# Patient Record
Sex: MALE | Race: WHITE | NOT HISPANIC OR LATINO | Employment: FULL TIME | URBAN - NONMETROPOLITAN AREA
[De-identification: names, ages, dates, MRNs, and addresses within clinical notes are randomized per-mention and may not be internally consistent; named-entity substitution may affect disease eponyms.]

---

## 2021-06-17 DIAGNOSIS — M25.561 RIGHT KNEE PAIN, UNSPECIFIED CHRONICITY: Primary | ICD-10-CM

## 2021-06-22 ENCOUNTER — OFFICE VISIT (OUTPATIENT)
Dept: ORTHOPEDIC SURGERY | Facility: CLINIC | Age: 56
End: 2021-06-22

## 2021-06-22 ENCOUNTER — HOSPITAL ENCOUNTER (OUTPATIENT)
Dept: GENERAL RADIOLOGY | Facility: HOSPITAL | Age: 56
Discharge: HOME | End: 2021-06-22
Admitting: PHYSICIAN ASSISTANT

## 2021-06-22 VITALS
DIASTOLIC BLOOD PRESSURE: 86 MMHG | HEIGHT: 66 IN | WEIGHT: 235 LBS | SYSTOLIC BLOOD PRESSURE: 122 MMHG | BODY MASS INDEX: 37.77 KG/M2 | HEART RATE: 73 BPM

## 2021-06-22 DIAGNOSIS — S83.241A OTHER TEAR OF MEDIAL MENISCUS OF RIGHT KNEE AS CURRENT INJURY, INITIAL ENCOUNTER: ICD-10-CM

## 2021-06-22 DIAGNOSIS — M25.561 RIGHT KNEE PAIN, UNSPECIFIED CHRONICITY: ICD-10-CM

## 2021-06-22 DIAGNOSIS — S83.411A SPRAIN OF MEDIAL COLLATERAL LIGAMENT OF RIGHT KNEE, INITIAL ENCOUNTER: Primary | ICD-10-CM

## 2021-06-22 DIAGNOSIS — M17.11 PRIMARY OSTEOARTHRITIS OF RIGHT KNEE: ICD-10-CM

## 2021-06-22 PROCEDURE — 20610 DRAIN/INJ JOINT/BURSA W/O US: CPT | Performed by: PHYSICIAN ASSISTANT

## 2021-06-22 PROCEDURE — 73562 X-RAY EXAM OF KNEE 3: CPT | Performed by: RADIOLOGY

## 2021-06-22 PROCEDURE — 99203 OFFICE O/P NEW LOW 30 MIN: CPT | Performed by: PHYSICIAN ASSISTANT

## 2021-06-22 PROCEDURE — 73562 X-RAY EXAM OF KNEE 3: CPT

## 2021-06-22 RX ORDER — LOSARTAN POTASSIUM AND HYDROCHLOROTHIAZIDE 25; 100 MG/1; MG/1
1 TABLET ORAL DAILY
COMMUNITY

## 2021-06-22 RX ORDER — AMLODIPINE BESYLATE 2.5 MG/1
2.5 TABLET ORAL DAILY
COMMUNITY

## 2021-06-22 RX ORDER — LIDOCAINE HYDROCHLORIDE 10 MG/ML
5 INJECTION, SOLUTION EPIDURAL; INFILTRATION; INTRACAUDAL; PERINEURAL
Status: COMPLETED | OUTPATIENT
Start: 2021-06-22 | End: 2021-06-22

## 2021-06-22 RX ORDER — CLOPIDOGREL BISULFATE 75 MG/1
75 TABLET ORAL DAILY
COMMUNITY

## 2021-06-22 RX ORDER — METHYLPREDNISOLONE ACETATE 80 MG/ML
80 INJECTION, SUSPENSION INTRA-ARTICULAR; INTRALESIONAL; INTRAMUSCULAR; SOFT TISSUE
Status: COMPLETED | OUTPATIENT
Start: 2021-06-22 | End: 2021-06-22

## 2021-06-22 RX ADMIN — LIDOCAINE HYDROCHLORIDE 5 ML: 10 INJECTION, SOLUTION EPIDURAL; INFILTRATION; INTRACAUDAL; PERINEURAL at 13:10

## 2021-06-22 RX ADMIN — METHYLPREDNISOLONE ACETATE 80 MG: 80 INJECTION, SUSPENSION INTRA-ARTICULAR; INTRALESIONAL; INTRAMUSCULAR; SOFT TISSUE at 13:10

## 2021-06-22 NOTE — PROGRESS NOTES
[facility] [place] Orthopaedic Surgery New Patient Visit          Patient: Sea Andrade  YOB: 1965  Date of Encounter: 06/22/2021  PCP: Dilip Hinton APRN      Subjective     Chief Complaint   Patient presents with   • Right Knee - Pain, Edema, Initial Evaluation           History of Present Illness:     Sea Andrade is a 56 y.o. male presents today secondary to an approximate 2-month history right knee pain following a twisting injury.  Patient reports that he has felt popping sensations as well as pain to the medial aspect of his knee following.  He did present to local urgent care as well as PCP in which radiographs were obtained as well as MRI.  He presents for evaluation and review.  He reports that he has been attempting hinged bracing with some benefit however sometimes he feels as if this may be too tight.  He reports dull throbbing aching sensation medially with sharp stabbing sensation upon twisting.  Intermittent swelling.  Patient denies any paresthesias.        There is no problem list on file for this patient.    Past Medical History:   Diagnosis Date   • High blood pressure    • Osteoarthritis    • Stroke (CMS/[facility])      Past Surgical History:   Procedure Laterality Date   • CHOLECYSTECTOMY       Social History     Occupational History   • Not on file   Tobacco Use   • Smoking status: Never Smoker   • Smokeless tobacco: Never Used   Vaping Use   • Vaping Use: Never used   Substance and Sexual Activity   • Alcohol use: Never   • Drug use: Not on file   • Sexual activity: Not on file    Sea Andrade  reports that he has never smoked. He has never used smokeless tobacco.. I have educated him on the risk of diseases from using tobacco products such as cancer, COPD and heart disease.          Social History     Social History Narrative   • Not on file     Family History   Problem Relation Age of Onset   • Osteoarthritis Father    • Diabetes Father      Current Outpatient Medications    "  Medication Sig Dispense Refill   • amLODIPine (NORVASC) 2.5 MG tablet Take 2.5 mg by mouth Daily.     • clopidogrel (PLAVIX) 75 MG tablet Take 75 mg by mouth Daily.     • losartan-hydrochlorothiazide (HYZAAR) 100-25 MG per tablet Take 1 tablet by mouth Daily.       No current facility-administered medications for this visit.     No Known Allergies         Review of Systems   Constitutional: Negative.   HENT: Negative.    Eyes: Negative.    Cardiovascular: Negative.    Respiratory: Negative.    Endocrine: Negative.    Hematologic/Lymphatic: Negative.    Skin: Negative.    Musculoskeletal: Positive for joint pain and joint swelling.        Pertinent positives listed in HPI   Gastrointestinal: Negative.    Genitourinary: Negative.    Neurological: Negative.    Psychiatric/Behavioral: Negative.    Allergic/Immunologic: Negative.          Objective      Vitals:    06/22/21 0948   BP: 122/86   Pulse: 73   Weight: 107 kg (235 lb)   Height: 167.6 cm (66\")      Patient's Body mass index is 37.93 kg/m². indicating that he is obese (BMI >30). We discussed portion control and increasing exercise..      Physical Exam  Vitals and nursing note reviewed.   Constitutional:       General: He is not in acute distress.     Appearance: Normal appearance. He is not ill-appearing.   HENT:      Head: Normocephalic and atraumatic.      Right Ear: External ear normal.      Left Ear: External ear normal.      Nose: Nose normal.      Mouth/Throat:      Mouth: Mucous membranes are moist.      Pharynx: Oropharynx is clear.   Eyes:      Extraocular Movements: Extraocular movements intact.      Conjunctiva/sclera: Conjunctivae normal.      Pupils: Pupils are equal, round, and reactive to light.   Cardiovascular:      Rate and Rhythm: Normal rate.      Pulses: Normal pulses.   Pulmonary:      Effort: Pulmonary effort is normal.   Abdominal:      General: There is no distension.   Musculoskeletal:      Cervical back: Normal range of motion. No " rigidity.      Comments: Examination today the patient's right knee reveals scant effusion.  Medial joint line tenderness with palpation.  Patient also exhibits tenderness along the origin and insertion mid body of the MCL with painful valgus stress.  No significant instability.  Cruciate and collateral ligaments intact throughout.  Pratik's and Apley's grind test equivocal medially.  Full flexion extension.  Neurovascular status grossly intact.   Skin:     General: Skin is warm and dry.      Capillary Refill: Capillary refill takes less than 2 seconds.   Neurological:      General: No focal deficit present.      Mental Status: He is alert and oriented to person, place, and time.      Cranial Nerves: Cranial nerves are intact.   Psychiatric:         Mood and Affect: Mood normal.         Behavior: Behavior normal.                 Radiology:      XR Knee 3 View Right    Result Date: 6/22/2021  No acute bony abnormality  This report was finalized on 6/22/2021 9:34 AM by Dr. Mick Caballero MD.      MRI from outside source performed 6/10/2021 reveals tear of the meniscus that extends to the inferior surface.  There is degeneration or especially patella.  Prepatellar edema versus inflammation.  Evidence of sprain of the lateral ligament with edema at the origin and thickening of the fibers.        Assessment/Plan        ICD-10-CM ICD-9-CM   1. Sprain of medial collateral ligament of right knee, initial encounter  S83.411A 844.1   2. Primary osteoarthritis of right knee  M17.11 715.16   3. Other tear of medial meniscus of right knee as current injury, initial encounter  S83.241A 836.0       56-year-old male with notable evidence of early osteoarthritis right knee with recent injury which patient suffered right MCL sprain and questionable acute versus subacute posterior horn medial viscus tear.  As a diagnostic and therapeutic approach the patient was provided today with intra-articular steroid injection with Depo-Medrol  and lidocaine block into the intra-articular space of the right knee.  Patient tolerated procedure well.  He will continue with the hinged knee bracing and return back in 3 weeks for further evaluation.  There is possibility of the acute MCL sprain with concomitant meniscal tear versus subacute asymptomatic tear.  We will continue to monitor closely.      Large Joint Arthrocentesis: R knee  Date/Time: 6/22/2021 1:10 PM  Consent given by: patient  Site marked: site marked  Supporting Documentation  Indications: pain and diagnostic evaluation   Procedure Details  Location: knee - R knee  Needle size: 25 G  Approach: anterolateral  Medications administered: 80 mg methylPREDNISolone acetate 80 MG/ML; 5 mL lidocaine PF 1% 1 %  Patient tolerance: patient tolerated the procedure well with no immediate complications                      This document was signed by Jun Robison PA-C June 22, 2021     CC: Dilip Hinton APRN EMR Dragon/Transcription disclaimer:  Part of this note may be completed utilizing the dragon speech recognition software. This electronic transcription/translation of spoken language to printed text may contain grammatical errors, random word insertions, pronoun errors, and incomplete sentences or occasional consequences of the system due to software limitations, ambient noise, and hardware issues.  Any questions or concerns about the content, text, or information contained within the body of this dictation should be directly addressed to the physician for clarification.

## 2021-06-23 ENCOUNTER — TELEPHONE (OUTPATIENT)
Dept: ORTHOPEDIC SURGERY | Facility: CLINIC | Age: 56
End: 2021-06-23

## 2021-06-23 NOTE — TELEPHONE ENCOUNTER
Caller: SANDRA    Relationship: [facility] SAFETY AND CLAIMS    Best call back number: [phone]    What form or medical record are you requesting: WORK STATUS NOTE, OFFICE NOTE 06.22.21    Who is requesting this form or medical record from you:     How would you like to receive the form or medical records (pick-up, mail, fax): FAX  If fax, what is the fax number: [phone]       Timeframe paperwork needed: ASAP    Additional notes: N/A

## 2021-06-24 NOTE — TELEPHONE ENCOUNTER
[employer] ATTEMPTED CLINICAL WAR TRANSFER UNSUCCESSFUL     Caller: SANDRA DANIELS      Relationship: [employer],       Best call back number: [phone]     What form or medical record are you requesting: WORK STATUS NOTE, OFFICE NOTE 06.22.21     Who is requesting this form or medical record from you:      How would you like to receive the form or medical records (pick-up, mail, fax): FAX  If fax, what is the fax number: [phone]      Timeframe paperwork needed: ASAP     Additional notes: SANDRA INFORMED PER PREVIOUS MESSAGE THAT PAPERWORK WAS FAXED -[phone] EARLIER THIS MORNING THURS 06/24/21 @ 0935 AM. SANDRA STATED THE FAX DID NOT COME THRU & REQUESTED PAPERWORK BE RE-FAXED PLEASE.     Best call back number: [phone]     THANKS

## 2021-07-13 ENCOUNTER — OFFICE VISIT (OUTPATIENT)
Dept: ORTHOPEDIC SURGERY | Facility: CLINIC | Age: 56
End: 2021-07-13

## 2021-07-13 VITALS — BODY MASS INDEX: 37.77 KG/M2 | WEIGHT: 235 LBS | HEIGHT: 66 IN

## 2021-07-13 DIAGNOSIS — S83.241A OTHER TEAR OF MEDIAL MENISCUS OF RIGHT KNEE AS CURRENT INJURY, INITIAL ENCOUNTER: ICD-10-CM

## 2021-07-13 DIAGNOSIS — S83.411A SPRAIN OF MEDIAL COLLATERAL LIGAMENT OF RIGHT KNEE, INITIAL ENCOUNTER: Primary | ICD-10-CM

## 2021-07-13 PROCEDURE — 99213 OFFICE O/P EST LOW 20 MIN: CPT | Performed by: PHYSICIAN ASSISTANT

## 2021-07-13 NOTE — PROGRESS NOTES
[facility] [place] Orthopaedic Surgery Established Patient Visit          Patient: Sea Andrade  YOB: 1965  Date of Encounter: 07/13/2021  PCP: Dilip Hinton APRN      Subjective     Chief Complaint   Patient presents with   • Right Knee - Follow-up, Pain           History of Present Illness:     Sea Andrade is a 56-year-old male presents today for approaching 3-month history right knee pain following a twisting injury.  Patient work-related injury.  Patient states that he felt a popping sensation to the medial aspect of his knee causing him to fall.  He presented to local urgent care as well as his PCP in which radiographs were initially pain as well as MRI and there was evidence of posterior horn medial meniscus tear with extension into the articular surface.  The patient has undergone conservative treatment thus far with intra-articular steroid injection as well as hinged knee bracing.  He reports some improvement with his pain however he still has pain to the medial aspect of his knee worse upon range of motion and weightbearing.  He reports no other new complaints.  Describes intermittent swelling.    There is no problem list on file for this patient.    Past Medical History:   Diagnosis Date   • High blood pressure    • Osteoarthritis    • Stroke (CMS/HCC)      Past Surgical History:   Procedure Laterality Date   • CHOLECYSTECTOMY       Social History     Occupational History   • Not on file   Tobacco Use   • Smoking status: Never Smoker   • Smokeless tobacco: Never Used   Vaping Use   • Vaping Use: Never used   Substance and Sexual Activity   • Alcohol use: Never   • Drug use: Not on file   • Sexual activity: Defer    Sea Andrade  reports that he has never smoked. He has never used smokeless tobacco.. I have educated him on the risk of diseases from using tobacco products such as cancer, COPD and heart disease.          Social History     Social History Narrative   • Not on file  "    Family History   Problem Relation Age of Onset   • Osteoarthritis Father    • Diabetes Father      Current Outpatient Medications   Medication Sig Dispense Refill   • amLODIPine (NORVASC) 2.5 MG tablet Take 2.5 mg by mouth Daily.     • clopidogrel (PLAVIX) 75 MG tablet Take 75 mg by mouth Daily.     • losartan-hydrochlorothiazide (HYZAAR) 100-25 MG per tablet Take 1 tablet by mouth Daily.       No current facility-administered medications for this visit.     No Known Allergies         Review of Systems   Constitutional: Negative.   HENT: Negative.    Eyes: Negative.    Cardiovascular: Negative.    Respiratory: Negative.    Endocrine: Negative.    Hematologic/Lymphatic: Negative.    Skin: Negative.    Musculoskeletal: Positive for joint pain and joint swelling.        Pertinent positives listed in HPI   Gastrointestinal: Negative.    Genitourinary: Negative.    Neurological: Negative.    Psychiatric/Behavioral: Negative.    Allergic/Immunologic: Negative.          Objective      Vitals:    07/13/21 0931   Weight: 107 kg (235 lb)   Height: 167.6 cm (66\")     Patient's Body mass index is 37.93 kg/m². indicating that he is obese (BMI >30). Obesity-related health conditions include the following: Listed in PMH. Obesity is unchanged. BMI is is above average; BMI management plan is completed. We discussed portion control and increasing exercise..      Physical Exam  Vitals and nursing note reviewed.   Constitutional:       General: He is not in acute distress.     Appearance: Normal appearance. He is not ill-appearing.   HENT:      Head: Normocephalic and atraumatic.      Right Ear: External ear normal.      Left Ear: External ear normal.      Nose: Nose normal.      Mouth/Throat:      Mouth: Mucous membranes are moist.      Pharynx: Oropharynx is clear.   Eyes:      Extraocular Movements: Extraocular movements intact.      Conjunctiva/sclera: Conjunctivae normal.      Pupils: Pupils are equal, round, and reactive to " light.   Cardiovascular:      Rate and Rhythm: Normal rate.      Pulses: Normal pulses.   Pulmonary:      Effort: Pulmonary effort is normal.   Abdominal:      General: There is no distension.   Musculoskeletal:      Cervical back: Normal range of motion. No rigidity.      Comments: Examination today the patient's right knee reveals scant effusion.  Medial joint line tenderness with palpation.  Patient also exhibits tenderness along the origin and insertion mid body of the MCL with painful valgus stress.  No significant instability.  Cruciate and collateral ligaments intact throughout.  Pratik's and Apley's grind test equivocal medially.  Full flexion extension.  Neurovascular status grossly intact.   Skin:     General: Skin is warm and dry.      Capillary Refill: Capillary refill takes less than 2 seconds.   Neurological:      General: No focal deficit present.      Mental Status: He is alert and oriented to person, place, and time.      Cranial Nerves: Cranial nerves are intact.   Psychiatric:         Mood and Affect: Mood normal.         Behavior: Behavior normal.             Radiology:      MRI from outside source performed 6/10/2021 reveals tear of the meniscus that extends to the inferior surface.  There is degeneration or especially patella.  Prepatellar edema versus inflammation.  Evidence of sprain of the lateral ligament with edema at the origin and thickening of the fibers.        Assessment/Plan        ICD-10-CM ICD-9-CM   1. Sprain of medial collateral ligament of right knee, initial encounter  S83.411A 844.1   2. Other tear of medial meniscus of right knee as current injury, initial encounter  S83.241A 836.0       56-year-old male with notable evidence of early osteoarthritis right knee with recent injury which patient suffered right MCL sprain and posterior horn medial meniscus tear.  The patient has responded well with the intra-articular injection of hinged knee bracing however he still has mild  knee pain along the origin and insertion of the MCL.  He reports some instability.  As result of this patient will undergo formal outpatient physical therapy with emphasis on decreasing pain in symptoms and increasing overall strength and stability.  Patient return back in 4 weeks for further evaluation.  If no notable response following conservative treatment options and continued medial knee pain discussed possibility of knee arthroscopy as result of the meniscus tear            This document was signed by Jun Robison PA-C July 13, 2021     CC: Dilip Hinton APRN EMR Dragon/Transcription disclaimer:  Part of this note may be completed utilizing the dragon speech recognition software. This electronic transcription/translation of spoken language to printed text may contain grammatical errors, random word insertions, pronoun errors, and incomplete sentences or occasional consequences of the system due to software limitations, ambient noise, and hardware issues.  Any questions or concerns about the content, text, or information contained within the body of this dictation should be directly addressed to the physician for clarification.

## 2021-08-10 ENCOUNTER — OFFICE VISIT (OUTPATIENT)
Dept: ORTHOPEDIC SURGERY | Facility: CLINIC | Age: 56
End: 2021-08-10

## 2021-08-10 VITALS — BODY MASS INDEX: 36.96 KG/M2 | WEIGHT: 230 LBS | HEIGHT: 66 IN

## 2021-08-10 DIAGNOSIS — S83.241A OTHER TEAR OF MEDIAL MENISCUS OF RIGHT KNEE AS CURRENT INJURY, INITIAL ENCOUNTER: ICD-10-CM

## 2021-08-10 DIAGNOSIS — S83.411D SPRAIN OF MEDIAL COLLATERAL LIGAMENT OF RIGHT KNEE, SUBSEQUENT ENCOUNTER: Primary | ICD-10-CM

## 2021-08-10 PROCEDURE — 99213 OFFICE O/P EST LOW 20 MIN: CPT | Performed by: PHYSICIAN ASSISTANT

## 2021-08-10 NOTE — PROGRESS NOTES
[facility] [place] Orthopaedic Surgery Established Patient Visit          Patient: Sea Andrade  YOB: 1965  Date of Encounter: 08/10/2021  PCP: Dilip Hinton APRN      Subjective     Chief Complaint   Patient presents with   • Right Knee - Pain, Follow-up           History of Present Illness:     Sea Andrade is a 56-year-old male presents today approaching 4-month history of right knee pain following a work-related twisting injury.  Patient initially felt popping sensation with pain to the medial aspect of the knee.  He has been compliant with bracing and conservative treatment with majority of the instability and pain resolved following injection intra-articularly in the hinged knee brace however he still has any waxing and waning intermittent knee pain upon twisting.  He states that this flares up several times a week.  He describes intermittent swelling with difficulty with ambulation at this time.  Denies any paresthesias.    There is no problem list on file for this patient.    Past Medical History:   Diagnosis Date   • High blood pressure    • Osteoarthritis    • Stroke (CMS/[facility])      Past Surgical History:   Procedure Laterality Date   • CHOLECYSTECTOMY       Social History     Occupational History   • Not on file   Tobacco Use   • Smoking status: Never Smoker   • Smokeless tobacco: Never Used   Vaping Use   • Vaping Use: Never used   Substance and Sexual Activity   • Alcohol use: Never   • Drug use: Not on file   • Sexual activity: Defer    Sea Andrade  reports that he has never smoked. He has never used smokeless tobacco.. I have educated him on the risk of diseases from using tobacco products such as cancer, COPD and heart disease.          Social History     Social History Narrative   • Not on file     Family History   Problem Relation Age of Onset   • Osteoarthritis Father    • Diabetes Father      Current Outpatient Medications   Medication Sig Dispense Refill   • amLODIPine  "(NORVASC) 2.5 MG tablet Take 2.5 mg by mouth Daily.     • clopidogrel (PLAVIX) 75 MG tablet Take 75 mg by mouth Daily.     • losartan-hydrochlorothiazide (HYZAAR) 100-25 MG per tablet Take 1 tablet by mouth Daily.       No current facility-administered medications for this visit.     No Known Allergies         Review of Systems   Constitutional: Negative.   HENT: Negative.    Eyes: Negative.    Cardiovascular: Negative.    Respiratory: Negative.    Endocrine: Negative.    Hematologic/Lymphatic: Negative.    Skin: Negative.    Musculoskeletal: Positive for joint pain and joint swelling.        Pertinent positives listed in HPI   Gastrointestinal: Negative.    Genitourinary: Negative.    Neurological: Negative.    Psychiatric/Behavioral: Negative.    Allergic/Immunologic: Negative.          Objective      Vitals:    08/10/21 0850   Weight: 104 kg (230 lb)   Height: 167.6 cm (66\")     Patient's Body mass index is 37.12 kg/m². indicating that he is obese (BMI >30). Obesity-related health conditions include the following: Listed in PMH. Obesity is unchanged. BMI is is above average; BMI management plan is completed. We discussed portion control and increasing exercise..      Physical Exam  Vitals and nursing note reviewed.   Constitutional:       General: He is not in acute distress.     Appearance: Normal appearance. He is not ill-appearing.   HENT:      Head: Normocephalic and atraumatic.      Right Ear: External ear normal.      Left Ear: External ear normal.      Nose: Nose normal.      Mouth/Throat:      Mouth: Mucous membranes are moist.      Pharynx: Oropharynx is clear.   Eyes:      Extraocular Movements: Extraocular movements intact.      Conjunctiva/sclera: Conjunctivae normal.      Pupils: Pupils are equal, round, and reactive to light.   Cardiovascular:      Rate and Rhythm: Normal rate.      Pulses: Normal pulses.   Pulmonary:      Effort: Pulmonary effort is normal.   Abdominal:      General: There is no " distension.   Musculoskeletal:      Cervical back: Normal range of motion. No rigidity.      Comments: Examination today the patient's right knee reveals scant effusion.  Medial joint line tenderness with palpation.  Patient also exhibits reduction of the previous tenderness along the origin and insertion mid body of the MCL with nonpainful valgus stress.  No significant instability.  Cruciate and collateral ligaments intact throughout.  Pratik's and Apley's grind test equivocal medially.  Full flexion extension.  Neurovascular status grossly intact.   Skin:     General: Skin is warm and dry.      Capillary Refill: Capillary refill takes less than 2 seconds.   Neurological:      General: No focal deficit present.      Mental Status: He is alert and oriented to person, place, and time.      Cranial Nerves: Cranial nerves are intact.   Psychiatric:         Mood and Affect: Mood normal.         Behavior: Behavior normal.             Radiology:      MRI from outside source performed 6/10/2021 reveals tear of the meniscus that extends to the inferior surface.  There is degeneration or especially patella.  Prepatellar edema versus inflammation.  Evidence of sprain of the lateral ligament with edema at the origin and thickening of the fibers.        Assessment/Plan        ICD-10-CM ICD-9-CM   1. Sprain of medial collateral ligament of right knee, subsequent encounter  S83.411D V58.89     844.1   2. Other tear of medial meniscus of right knee as current injury, initial encounter  S83.241A 836.0       56-year-old male with notable evidence of early osteoarthritis right knee with recent injury which patient suffered an MCL sprain and posterior horn medial viscus tear.  The patient has been compliant and receptive to conservative treatment with reduction of majority the medial knee pain and arthritic symptoms.  The patient continues to have waxing and waning and positional pain upon motion and twisting of the consistent with  the posterior horn medial viscus tear.  We discussed the possibility of knee arthroscopy as result of meniscal tear the patient would like to discuss with his employer and family before making any decisions.  Patient was instructed to return back in 4 weeks for further evaluation        This document was signed by Jun Robison PA-C August 10, 2021     CC: Dilip Hinton APRN EMR Dragon/Transcription disclaimer:  Part of this note may be completed utilizing the dragon speech recognition software. This electronic transcription/translation of spoken language to printed text may contain grammatical errors, random word insertions, pronoun errors, and incomplete sentences or occasional consequences of the system due to software limitations, ambient noise, and hardware issues.  Any questions or concerns about the content, text, or information contained within the body of this dictation should be directly addressed to the physician for clarification.

## 2021-10-18 ENCOUNTER — OFFICE VISIT (OUTPATIENT)
Dept: ORTHOPEDIC SURGERY | Facility: CLINIC | Age: 56
End: 2021-10-18

## 2021-10-18 VITALS — TEMPERATURE: 98.6 F | HEIGHT: 66 IN | BODY MASS INDEX: 36.96 KG/M2 | WEIGHT: 230 LBS

## 2021-10-18 DIAGNOSIS — M17.11 PRIMARY OSTEOARTHRITIS OF RIGHT KNEE: ICD-10-CM

## 2021-10-18 DIAGNOSIS — S83.241D OTHER TEAR OF MEDIAL MENISCUS OF RIGHT KNEE AS CURRENT INJURY, SUBSEQUENT ENCOUNTER: Primary | ICD-10-CM

## 2021-10-18 PROCEDURE — 99213 OFFICE O/P EST LOW 20 MIN: CPT | Performed by: PHYSICIAN ASSISTANT

## 2021-10-18 PROCEDURE — 20610 DRAIN/INJ JOINT/BURSA W/O US: CPT | Performed by: PHYSICIAN ASSISTANT

## 2021-10-18 RX ORDER — METHYLPREDNISOLONE ACETATE 80 MG/ML
80 INJECTION, SUSPENSION INTRA-ARTICULAR; INTRALESIONAL; INTRAMUSCULAR; SOFT TISSUE
Status: COMPLETED | OUTPATIENT
Start: 2021-10-18 | End: 2021-10-18

## 2021-10-18 RX ORDER — LIDOCAINE HYDROCHLORIDE 10 MG/ML
5 INJECTION, SOLUTION EPIDURAL; INFILTRATION; INTRACAUDAL; PERINEURAL
Status: COMPLETED | OUTPATIENT
Start: 2021-10-18 | End: 2021-10-18

## 2021-10-18 RX ADMIN — METHYLPREDNISOLONE ACETATE 80 MG: 80 INJECTION, SUSPENSION INTRA-ARTICULAR; INTRALESIONAL; INTRAMUSCULAR; SOFT TISSUE at 09:27

## 2021-10-18 RX ADMIN — LIDOCAINE HYDROCHLORIDE 5 ML: 10 INJECTION, SOLUTION EPIDURAL; INFILTRATION; INTRACAUDAL; PERINEURAL at 09:27

## 2021-10-18 NOTE — PROGRESS NOTES
[facility] – [place], [place] Orthopaedic Surgery Established Patient Visit          Patient: Sea Andrade  YOB: 1965  Date of Encounter: 10/18/2021  PCP: Dilip Hinton APRN      Subjective     Chief Complaint   Patient presents with   • Right Knee - Follow-up, Pain           History of Present Illness:     Sea Andrade is a 56-year-old male presents approaching 6-month history of right knee pain following work-related twisting injury.  Patient has notable large posterior horn medial meniscus tear MRI confirmed.  He has responded temporarily with previous conservative treatment.  He continues to have waxing and waning intermittent knee pain upon twisting.  Distal continues to flareup several times a week. He describes intermittent swelling with difficulty with ambulation and performing his occupation at times.  Denies any paresthesias.    There is no problem list on file for this patient.    Past Medical History:   Diagnosis Date   • High blood pressure    • Osteoarthritis    • Stroke (HCC)      Past Surgical History:   Procedure Laterality Date   • CHOLECYSTECTOMY       Social History     Occupational History   • Not on file   Tobacco Use   • Smoking status: Never Smoker   • Smokeless tobacco: Never Used   Vaping Use   • Vaping Use: Never used   Substance and Sexual Activity   • Alcohol use: Never   • Drug use: Never   • Sexual activity: Defer    Sea Andrade  reports that he has never smoked. He has never used smokeless tobacco.. I have educated him on the risk of diseases from using tobacco products such as cancer, COPD and heart disease.          Social History     Social History Narrative   • Not on file     Family History   Problem Relation Age of Onset   • Osteoarthritis Father    • Diabetes Father      Current Outpatient Medications   Medication Sig Dispense Refill   • amLODIPine (NORVASC) 2.5 MG tablet Take 2.5 mg by mouth Daily.     • clopidogrel (PLAVIX) 75 MG tablet Take 75 mg by mouth Daily.    "  • losartan-hydrochlorothiazide (HYZAAR) 100-25 MG per tablet Take 1 tablet by mouth Daily.       No current facility-administered medications for this visit.     No Known Allergies         Review of Systems   Constitutional: Negative.   HENT: Negative.    Eyes: Negative.    Cardiovascular: Negative.    Respiratory: Negative.    Endocrine: Negative.    Hematologic/Lymphatic: Negative.    Skin: Negative.    Musculoskeletal: Positive for joint pain and joint swelling.        Pertinent positives listed in HPI   Gastrointestinal: Negative.    Genitourinary: Negative.    Neurological: Negative.    Psychiatric/Behavioral: Negative.    Allergic/Immunologic: Negative.          Objective      Vitals:    10/18/21 0829   Temp: 98.6 °F (37 °C)   Weight: 104 kg (230 lb)   Height: 167.6 cm (66\")     Patient's Body mass index is 37.12 kg/m². indicating that he is obese (BMI >30). Obesity-related health conditions include the following: Listed in PMH. Obesity is unchanged. BMI is is above average; BMI management plan is completed. We discussed portion control and increasing exercise..      Physical Exam  Vitals and nursing note reviewed.   Constitutional:       General: He is not in acute distress.     Appearance: Normal appearance. He is not ill-appearing.   HENT:      Head: Normocephalic and atraumatic.      Right Ear: External ear normal.      Left Ear: External ear normal.      Nose: Nose normal.      Mouth/Throat:      Mouth: Mucous membranes are moist.      Pharynx: Oropharynx is clear.   Eyes:      Extraocular Movements: Extraocular movements intact.      Conjunctiva/sclera: Conjunctivae normal.      Pupils: Pupils are equal, round, and reactive to light.   Cardiovascular:      Rate and Rhythm: Normal rate.      Pulses: Normal pulses.   Pulmonary:      Effort: Pulmonary effort is normal.   Abdominal:      General: There is no distension.   Musculoskeletal:      Cervical back: Normal range of motion. No rigidity.      " Comments: Examination today the patient's right knee reveals scant effusion.  Medial joint line tenderness with palpation. nonpainful valgus stress.  No significant instability.  Cruciate and collateral ligaments intact throughout.  Pratik's and Apley's grind test equivocal medially.  Full flexion extension.  Neurovascular status grossly intact.   Skin:     General: Skin is warm and dry.      Capillary Refill: Capillary refill takes less than 2 seconds.   Neurological:      General: No focal deficit present.      Mental Status: He is alert and oriented to person, place, and time.      Cranial Nerves: Cranial nerves are intact.   Psychiatric:         Mood and Affect: Mood normal.         Behavior: Behavior normal.               Radiology:      MRI from outside source performed 6/10/2021 reveals tear of the meniscus that extends to the inferior surface.  There is degeneration or especially patella.  Prepatellar edema versus inflammation.  Evidence of sprain of the lateral ligament with edema at the origin and thickening of the fibers.        Assessment/Plan        ICD-10-CM ICD-9-CM   1. Other tear of medial meniscus of right knee as current injury, subsequent encounter  S83.241D V58.89     836.0   2. Primary osteoarthritis of right knee  M17.11 715.16       56-year-old male with notable early right knee osteoarthritis and work-related injury which patient suffered a fairly large posterior horn medial meniscus tear with extension to the articular surface.  Patient would like to forego surgical intervention if at all possible and requested that we repeat intra-articular steroid injection as this does provide temporary response.  He would like to discuss with his family and employer before making any decisions on knee arthroscopy.  He was instructed to return back in 6 weeks for further evaluation.    Large Joint Arthrocentesis: R knee  Date/Time: 10/18/2021 9:27 AM  Consent given by: patient  Site marked: site  marked  Supporting Documentation  Indications: pain and diagnostic evaluation   Procedure Details  Location: knee - R knee  Needle size: 25 G  Approach: anterolateral  Medications administered: 80 mg methylPREDNISolone acetate 80 MG/ML; 5 mL lidocaine PF 1% 1 %  Patient tolerance: patient tolerated the procedure well with no immediate complications              This document was signed by Jun Robison PA-C October 18, 2021     CC: Dilip Hinton APRN       EMR Dragon/Transcription disclaimer:  Part of this note may be completed utilizing the dragon speech recognition software. This electronic transcription/translation of spoken language to printed text may contain grammatical errors, random word insertions, pronoun errors, and incomplete sentences or occasional consequences of the system due to software limitations, ambient noise, and hardware issues.  Any questions or concerns about the content, text, or information contained within the body of this dictation should be directly addressed to the physician for clarification.

## 2021-11-30 ENCOUNTER — OFFICE VISIT (OUTPATIENT)
Dept: ORTHOPEDIC SURGERY | Facility: CLINIC | Age: 56
End: 2021-11-30

## 2021-11-30 VITALS — WEIGHT: 230 LBS | HEIGHT: 66 IN | BODY MASS INDEX: 36.96 KG/M2

## 2021-11-30 DIAGNOSIS — M17.11 PRIMARY OSTEOARTHRITIS OF RIGHT KNEE: ICD-10-CM

## 2021-11-30 DIAGNOSIS — S83.241D OTHER TEAR OF MEDIAL MENISCUS OF RIGHT KNEE AS CURRENT INJURY, SUBSEQUENT ENCOUNTER: Primary | ICD-10-CM

## 2021-11-30 PROCEDURE — 99213 OFFICE O/P EST LOW 20 MIN: CPT | Performed by: PHYSICIAN ASSISTANT

## 2021-11-30 NOTE — PROGRESS NOTES
[facility] – [place] Orthopaedic Surgery Established Patient Visit          Patient: Sea Andrade  YOB: 1965  Date of Encounter: 11/30/2021  PCP: Dilip Hinton APRN      Subjective     Chief Complaint   Patient presents with   • Right Knee - Follow-up           History of Present Illness:     Sea Andrade is a 56-year-old male presents greater than 6-month history of right knee pain following work-related twisting injury.  Patient has notable large posterior horn medial meniscus tear MRI confirmed.  Patient has responded with near complete resolution of pain symptoms with the previous intra-articular steroid injection.  Patient states that very minimally will he have occasional sharp burning sensation to the knee that is quickly resolved with decreased ambulation and relative rest.  Patient states that he is having less flareups.  He is able to continue with his ambulation and occupation.  No other new complaints.  Denies any paresthesias.        There is no problem list on file for this patient.    Past Medical History:   Diagnosis Date   • High blood pressure    • Osteoarthritis    • Stroke (HCC)      Past Surgical History:   Procedure Laterality Date   • CHOLECYSTECTOMY       Social History     Occupational History   • Not on file   Tobacco Use   • Smoking status: Never Smoker   • Smokeless tobacco: Never Used   Vaping Use   • Vaping Use: Never used   Substance and Sexual Activity   • Alcohol use: Never   • Drug use: Never   • Sexual activity: Defer    Sea Andrade  reports that he has never smoked. He has never used smokeless tobacco.. I have educated him on the risk of diseases from using tobacco products such as cancer, COPD and heart disease.          Social History     Social History Narrative   • Not on file     Family History   Problem Relation Age of Onset   • Osteoarthritis Father    • Diabetes Father      Current Outpatient Medications   Medication Sig Dispense Refill   • amLODIPine  "(NORVASC) 2.5 MG tablet Take 2.5 mg by mouth Daily.     • clopidogrel (PLAVIX) 75 MG tablet Take 75 mg by mouth Daily.     • losartan-hydrochlorothiazide (HYZAAR) 100-25 MG per tablet Take 1 tablet by mouth Daily.       No current facility-administered medications for this visit.     No Known Allergies         Review of Systems   Constitutional: Negative.   HENT: Negative.    Eyes: Negative.    Cardiovascular: Negative.    Respiratory: Negative.    Endocrine: Negative.    Hematologic/Lymphatic: Negative.    Skin: Negative.    Musculoskeletal: Positive for joint pain and joint swelling.        Pertinent positives listed in HPI   Gastrointestinal: Negative.    Genitourinary: Negative.    Neurological: Negative.    Psychiatric/Behavioral: Negative.    Allergic/Immunologic: Negative.          Objective      Vitals:    11/30/21 0833   Weight: 104 kg (230 lb)   Height: 167.6 cm (66\")     Patient's Body mass index is 37.12 kg/m². indicating that he is obese (BMI >30). Obesity-related health conditions include the following: Listed in PMH. Obesity is unchanged. BMI is is above average; BMI management plan is completed. We discussed portion control and increasing exercise..      Physical Exam  Vitals and nursing note reviewed.   Constitutional:       General: He is not in acute distress.     Appearance: Normal appearance. He is not ill-appearing.   HENT:      Head: Normocephalic and atraumatic.      Right Ear: External ear normal.      Left Ear: External ear normal.      Nose: Nose normal.      Mouth/Throat:      Mouth: Mucous membranes are moist.      Pharynx: Oropharynx is clear.   Eyes:      Extraocular Movements: Extraocular movements intact.      Conjunctiva/sclera: Conjunctivae normal.      Pupils: Pupils are equal, round, and reactive to light.   Cardiovascular:      Rate and Rhythm: Normal rate.      Pulses: Normal pulses.   Pulmonary:      Effort: Pulmonary effort is normal.   Abdominal:      General: There is no " distension.   Musculoskeletal:      Cervical back: Normal range of motion. No rigidity.      Comments: Examination today the patient's right knee reveals scant effusion. No notable Medial joint line tenderness with palpation today. nonpainful valgus stress.  No significant instability.  Cruciate and collateral ligaments intact throughout.  Pratik's and Apley's grind test equivocal medially.  Full flexion/extension.  Neurovascular status grossly intact.   Skin:     General: Skin is warm and dry.      Capillary Refill: Capillary refill takes less than 2 seconds.   Neurological:      General: No focal deficit present.      Mental Status: He is alert and oriented to person, place, and time.      Cranial Nerves: Cranial nerves are intact.   Psychiatric:         Mood and Affect: Mood normal.         Behavior: Behavior normal.               Radiology:      MRI from outside source performed 6/10/2021 reveals tear of the meniscus that extends to the inferior surface.  There is degeneration or especially patella.  Prepatellar edema versus inflammation.  Evidence of sprain of the lateral ligament with edema at the origin and thickening of the fibers.        Assessment/Plan        ICD-10-CM ICD-9-CM   1. Other tear of medial meniscus of right knee as current injury, subsequent encounter  S83.241D V58.89     836.0   2. Primary osteoarthritis of right knee  M17.11 715.16       56-year-old male with notable early right knee osteoarthritis and work-related injury which patient suffered a fairly large posterior horn medial meniscus tear with extension to the articular surface.  Patient would like to continue to forego surgical intervention if at all possible.  He is still seeing improvement from the most recent intra-articular steroid injection and would like to make it through the end of the year and may be in the spring when his occupation slows down.  He was instructed to return back in 8 weeks for further evaluation discussion  of potential knee arthroscopy given the notable MRI confirmed a large medial viscus tear.  We will continue to implement activity modification and avoidance of aggravating factors.           This document was signed by Jun Robison PA-C November 30, 2021     CC: Dilip Hinton APRN EMR Dragon/Transcription disclaimer:  Part of this note may be completed utilizing the dragon speech recognition software. This electronic transcription/translation of spoken language to printed text may contain grammatical errors, random word insertions, pronoun errors, and incomplete sentences or occasional consequences of the system due to software limitations, ambient noise, and hardware issues.  Any questions or concerns about the content, text, or information contained within the body of this dictation should be directly addressed to the physician for clarification.

## 2022-02-17 ENCOUNTER — OFFICE VISIT (OUTPATIENT)
Dept: GASTROENTEROLOGY | Facility: CLINIC | Age: 57
End: 2022-02-17

## 2022-02-17 VITALS
HEART RATE: 78 BPM | DIASTOLIC BLOOD PRESSURE: 94 MMHG | OXYGEN SATURATION: 94 % | HEIGHT: 66 IN | WEIGHT: 238 LBS | BODY MASS INDEX: 38.25 KG/M2 | SYSTOLIC BLOOD PRESSURE: 143 MMHG

## 2022-02-17 DIAGNOSIS — Z12.11 ENCOUNTER FOR COLORECTAL CANCER SCREENING: ICD-10-CM

## 2022-02-17 DIAGNOSIS — Z12.12 ENCOUNTER FOR COLORECTAL CANCER SCREENING: ICD-10-CM

## 2022-02-17 DIAGNOSIS — R10.12 LEFT UPPER QUADRANT ABDOMINAL PAIN: ICD-10-CM

## 2022-02-17 DIAGNOSIS — R14.0 BLOATING: ICD-10-CM

## 2022-02-17 DIAGNOSIS — R12 HEARTBURN: ICD-10-CM

## 2022-02-17 DIAGNOSIS — R93.3 ABNORMAL CT SCAN, GASTROINTESTINAL TRACT: Primary | ICD-10-CM

## 2022-02-17 PROCEDURE — 99204 OFFICE O/P NEW MOD 45 MIN: CPT | Performed by: INTERNAL MEDICINE

## 2022-02-17 RX ORDER — BISACODYL 5 MG
TABLET, DELAYED RELEASE (ENTERIC COATED) ORAL
Qty: 4 TABLET | Refills: 0 | Status: SHIPPED | OUTPATIENT
Start: 2022-02-17

## 2022-02-17 NOTE — PROGRESS NOTES
"Subjective     Sea Andrade is a 56 y.o. male who presents to the office today as a consultation from No ref. provider found for evaluation of Abdominal Pain        History of Present Illness:  The patient presents for evaluation of abdominal pain and nausea.  Recently, he was seen in the ER at [facility] for abdominal pain and nausea.  This came on suddenly.  He states he was not feeling well while at work.  He came home and went to bed and it felt like an \"elephant had on my chest\" and was having difficulty breathing.  The patient's wife called an ambulance.  His BP was elevated in the ambulance.  He was ruled out for MI once he got to the hospital.  A CT scan performed at the hospital in [place].  He does not have the report with him.  I do not have the report available to me today.  He was told his stomach was full of fluid/food.  He has noted an increase in heartburn symptoms that were \"real bad\" about a month ago.  He was having reflux at night but this is eased off a bit.  He continues to have heartburn intermittently.  He is having trouble with bowel movements.  He feels constipated with abdominal swelling.  He has pain up under his left ribs. The pain is described as a dull achy pain. Nothing seems to bring it on.  The pain is bribed as constant and is waxing and waning in intensity. Since the ER visit, he has not had nausea or vomiting.  He was told he had COVID mid January but he was asymptomatic. He still can eat stating his appetite is good.  He has not lost weight.  In fact, he has gained weight per his report.  He has a bowel movement twice a day with incomplete evacuation.  Since his gallbladder was removed years ago, he has frequent bowel movements.  Since this recent episode, he does not empty well even though he is having daily bowel movements. He states he is not passing as much gas as he used to. He denies BRBPR or melena.  There is no family history of colon cancer.  His sister had " liver cancer.  He is not sure if she had colon cancer associated with that.  Overall, his symptoms have been present for a week but are not as severe as when he went to the ER last Friday.      Review of Systems:  Review of Systems   Constitutional: Negative.    HENT: Negative for sore throat and trouble swallowing.    Eyes: Negative.    Respiratory: Negative for chest tightness.    Cardiovascular: Negative for chest pain.   Gastrointestinal: Positive for abdominal distention, abdominal pain, constipation, nausea and vomiting. Negative for anal bleeding, blood in stool, diarrhea and rectal pain.   Endocrine: Negative.    Genitourinary: Negative for difficulty urinating.   Musculoskeletal: Negative for back pain and neck pain.   Skin: Negative.    Allergic/Immunologic: Negative for environmental allergies and food allergies.   Neurological: Positive for headaches. Negative for dizziness.   Hematological: Does not bruise/bleed easily.   Psychiatric/Behavioral: Positive for sleep disturbance. Negative for agitation. The patient is not nervous/anxious.        Past Medical History:  Past Medical History:   Diagnosis Date   • High blood pressure    • Osteoarthritis    • Stroke (HCC)        Past Surgical History:  Past Surgical History:   Procedure Laterality Date   • CHOLECYSTECTOMY         Family History:  Family History   Problem Relation Age of Onset   • Osteoarthritis Father    • Diabetes Father        Social History:  Social History     Socioeconomic History   • Marital status: Unknown   Tobacco Use   • Smoking status: Never Smoker   • Smokeless tobacco: Never Used   Vaping Use   • Vaping Use: Never used   Substance and Sexual Activity   • Alcohol use: Never   • Drug use: Never   • Sexual activity: Defer       Current Medication List:    Current Outpatient Medications:   •  amLODIPine (NORVASC) 2.5 MG tablet, Take 2.5 mg by mouth Daily., Disp: , Rfl:   •  clopidogrel (PLAVIX) 75 MG tablet, Take 75 mg by mouth Daily.,  "Disp: , Rfl:   •  losartan-hydrochlorothiazide (HYZAAR) 100-25 MG per tablet, Take 1 tablet by mouth Daily., Disp: , Rfl:   •  bisacodyl (Dulcolax) 5 MG EC tablet, Take 4 tablets at 4pm the day prior to procedure with a full glass of water., Disp: 4 tablet, Rfl: 0  •  magnesium citrate solution, Take 296 mL by mouth Take As Directed. Follow bowel prep instructions given at office, Disp: 592 mL, Rfl: 0    Allergies:   Patient has no known allergies.    Vitals:  /94   Pulse 78   Ht 167.6 cm (66\")   Wt 108 kg (238 lb)   SpO2 94%   BMI 38.41 kg/m²     Physical Exam:  Physical Exam  Constitutional:       Appearance: He is obese.   HENT:      Head: Normocephalic and atraumatic.      Nose: Nose normal. No congestion or rhinorrhea.   Eyes:      General: No scleral icterus.     Extraocular Movements: Extraocular movements intact.      Conjunctiva/sclera: Conjunctivae normal.      Pupils: Pupils are equal, round, and reactive to light.   Cardiovascular:      Rate and Rhythm: Normal rate and regular rhythm.      Pulses: Normal pulses.      Heart sounds: Normal heart sounds.   Pulmonary:      Effort: Pulmonary effort is normal.      Breath sounds: Normal breath sounds.   Abdominal:      General: Abdomen is flat. Bowel sounds are normal. There is distension.      Palpations: Abdomen is soft. There is no shifting dullness, fluid wave, hepatomegaly, splenomegaly, mass or pulsatile mass.      Tenderness: There is abdominal tenderness (LUQ abdominal tenderness). There is no guarding or rebound.      Hernia: No hernia is present.   Musculoskeletal:         General: No swelling or tenderness.      Cervical back: Normal range of motion and neck supple.   Skin:     General: Skin is warm and dry.      Coloration: Skin is not jaundiced.   Neurological:      General: No focal deficit present.      Mental Status: He is alert and oriented to person, place, and time.   Psychiatric:         Mood and Affect: Mood normal.         " Behavior: Behavior normal.         Results Review:  Lab Results:   No visits with results within 1 Month(s) from this visit.   Latest known visit with results is:   No results found for any previous visit.       Assessment/Plan     Visit Diagnoses:    ICD-10-CM ICD-9-CM   1. Abnormal CT scan, gastrointestinal tract  R93.3 793.4   2. Encounter for colorectal cancer screening  Z12.11 V76.51    Z12.12 V76.41   3. Heartburn  R12 787.1   4. Bloating  R14.0 787.3   5. Left upper quadrant abdominal pain  R10.12 789.02       Plan:  I will have the patient undergo EGD/colonoscopy he will undergo EGD secondary to a distended stomach filled with fluid and food per his report he has noted increased abdominal distention.  I suspect this may be gastric outlet obstruction versus gastroparesis.  He does deny the use of NSAIDs on a regular basis.  He will undergo colonoscopy for screening purposes.  I will give him a trial of Linzess due to recent onset of incomplete bowel movements.  We will follow-up after his procedures.    ESOPHAGOGASTRODUODENOSCOPY WITH BIOPSY (N/A), COLONOSCOPY FOR SCREENING (N/A)      MEDS ORDERED DURING VISIT:  New Medications Ordered This Visit   Medications   • magnesium citrate solution     Sig: Take 296 mL by mouth Take As Directed. Follow bowel prep instructions given at office     Dispense:  592 mL     Refill:  0   • bisacodyl (Dulcolax) 5 MG EC tablet     Sig: Take 4 tablets at 4pm the day prior to procedure with a full glass of water.     Dispense:  4 tablet     Refill:  0       The patient will follow up after his procedures.             This document has been electronically signed by Kaylee Parker MD   February 17, 2022 20:40 EST        Part of this note may be an electronic transcription/translation of spoken language to printed text using the Dragon Dictation System.

## 2022-02-18 ENCOUNTER — LAB (OUTPATIENT)
Dept: LAB | Facility: HOSPITAL | Age: 57
End: 2022-02-18

## 2022-02-18 DIAGNOSIS — R93.3 ABNORMAL CT SCAN, GASTROINTESTINAL TRACT: ICD-10-CM

## 2022-02-18 DIAGNOSIS — R10.12 LEFT UPPER QUADRANT ABDOMINAL PAIN: Primary | ICD-10-CM

## 2022-02-18 DIAGNOSIS — R12 HEARTBURN: ICD-10-CM

## 2022-02-18 DIAGNOSIS — R14.0 BLOATING: ICD-10-CM

## 2022-02-18 DIAGNOSIS — Z12.12 ENCOUNTER FOR COLORECTAL CANCER SCREENING: ICD-10-CM

## 2022-02-18 DIAGNOSIS — Z12.11 ENCOUNTER FOR COLORECTAL CANCER SCREENING: ICD-10-CM

## 2022-02-18 DIAGNOSIS — R12 HEARTBURN: Primary | ICD-10-CM

## 2022-02-18 DIAGNOSIS — R10.12 LEFT UPPER QUADRANT ABDOMINAL PAIN: ICD-10-CM

## 2022-02-18 PROCEDURE — U0004 COV-19 TEST NON-CDC HGH THRU: HCPCS | Performed by: INTERNAL MEDICINE

## 2022-02-19 LAB — SARS-COV-2 RNA PNL SPEC NAA+PROBE: NOT DETECTED

## 2022-02-21 PROBLEM — R93.3 ABNORMAL CT SCAN, GASTROINTESTINAL TRACT: Status: ACTIVE | Noted: 2022-02-21

## 2022-02-21 PROBLEM — Z12.12 ENCOUNTER FOR COLORECTAL CANCER SCREENING: Status: ACTIVE | Noted: 2022-02-21

## 2022-02-21 PROBLEM — Z12.11 ENCOUNTER FOR COLORECTAL CANCER SCREENING: Status: ACTIVE | Noted: 2022-02-21

## 2022-02-22 ENCOUNTER — ANESTHESIA (OUTPATIENT)
Dept: PERIOP | Facility: HOSPITAL | Age: 57
End: 2022-02-22

## 2022-02-22 ENCOUNTER — ANESTHESIA EVENT (OUTPATIENT)
Dept: PERIOP | Facility: HOSPITAL | Age: 57
End: 2022-02-22

## 2022-02-22 ENCOUNTER — HOSPITAL ENCOUNTER (OUTPATIENT)
Facility: HOSPITAL | Age: 57
Setting detail: HOSPITAL OUTPATIENT SURGERY
Discharge: HOME | End: 2022-02-22
Attending: INTERNAL MEDICINE | Admitting: INTERNAL MEDICINE

## 2022-02-22 VITALS
HEART RATE: 71 BPM | RESPIRATION RATE: 18 BRPM | TEMPERATURE: 97.1 F | OXYGEN SATURATION: 96 % | SYSTOLIC BLOOD PRESSURE: 116 MMHG | DIASTOLIC BLOOD PRESSURE: 90 MMHG

## 2022-02-22 DIAGNOSIS — Z12.11 ENCOUNTER FOR COLORECTAL CANCER SCREENING: ICD-10-CM

## 2022-02-22 DIAGNOSIS — R93.3 ABNORMAL CT SCAN, GASTROINTESTINAL TRACT: ICD-10-CM

## 2022-02-22 DIAGNOSIS — Z12.12 ENCOUNTER FOR COLORECTAL CANCER SCREENING: ICD-10-CM

## 2022-02-22 PROCEDURE — 45380 COLONOSCOPY AND BIOPSY: CPT | Performed by: INTERNAL MEDICINE

## 2022-02-22 PROCEDURE — 43239 EGD BIOPSY SINGLE/MULTIPLE: CPT | Performed by: INTERNAL MEDICINE

## 2022-02-22 PROCEDURE — 45385 COLONOSCOPY W/LESION REMOVAL: CPT | Performed by: INTERNAL MEDICINE

## 2022-02-22 PROCEDURE — 25010000002 PROPOFOL 10 MG/ML EMULSION: Performed by: NURSE ANESTHETIST, CERTIFIED REGISTERED

## 2022-02-22 RX ORDER — MIDAZOLAM HYDROCHLORIDE 1 MG/ML
1 INJECTION INTRAMUSCULAR; INTRAVENOUS
Status: DISCONTINUED | OUTPATIENT
Start: 2022-02-22 | End: 2022-02-22 | Stop reason: HOSPADM

## 2022-02-22 RX ORDER — SODIUM CHLORIDE 0.9 % (FLUSH) 0.9 %
10 SYRINGE (ML) INJECTION AS NEEDED
Status: DISCONTINUED | OUTPATIENT
Start: 2022-02-22 | End: 2022-02-22 | Stop reason: HOSPADM

## 2022-02-22 RX ORDER — FENTANYL CITRATE 50 UG/ML
50 INJECTION, SOLUTION INTRAMUSCULAR; INTRAVENOUS
Status: DISCONTINUED | OUTPATIENT
Start: 2022-02-22 | End: 2022-02-22 | Stop reason: HOSPADM

## 2022-02-22 RX ORDER — SODIUM CHLORIDE 0.9 % (FLUSH) 0.9 %
10 SYRINGE (ML) INJECTION EVERY 12 HOURS SCHEDULED
Status: DISCONTINUED | OUTPATIENT
Start: 2022-02-22 | End: 2022-02-22 | Stop reason: HOSPADM

## 2022-02-22 RX ORDER — DROPERIDOL 2.5 MG/ML
0.62 INJECTION, SOLUTION INTRAMUSCULAR; INTRAVENOUS ONCE AS NEEDED
Status: DISCONTINUED | OUTPATIENT
Start: 2022-02-22 | End: 2022-02-22 | Stop reason: HOSPADM

## 2022-02-22 RX ORDER — MEPERIDINE HYDROCHLORIDE 25 MG/ML
12.5 INJECTION INTRAMUSCULAR; INTRAVENOUS; SUBCUTANEOUS
Status: DISCONTINUED | OUTPATIENT
Start: 2022-02-22 | End: 2022-02-22 | Stop reason: HOSPADM

## 2022-02-22 RX ORDER — LIDOCAINE HYDROCHLORIDE 20 MG/ML
INJECTION, SOLUTION INFILTRATION; PERINEURAL AS NEEDED
Status: DISCONTINUED | OUTPATIENT
Start: 2022-02-22 | End: 2022-02-22 | Stop reason: SURG

## 2022-02-22 RX ORDER — OXYCODONE HYDROCHLORIDE AND ACETAMINOPHEN 5; 325 MG/1; MG/1
1 TABLET ORAL ONCE AS NEEDED
Status: DISCONTINUED | OUTPATIENT
Start: 2022-02-22 | End: 2022-02-22 | Stop reason: HOSPADM

## 2022-02-22 RX ORDER — PANTOPRAZOLE SODIUM 40 MG/1
40 TABLET, DELAYED RELEASE ORAL DAILY
Qty: 90 TABLET | Refills: 3 | Status: SHIPPED | OUTPATIENT
Start: 2022-02-22 | End: 2022-09-09 | Stop reason: SDUPTHER

## 2022-02-22 RX ORDER — PROPOFOL 10 MG/ML
VIAL (ML) INTRAVENOUS AS NEEDED
Status: DISCONTINUED | OUTPATIENT
Start: 2022-02-22 | End: 2022-02-22 | Stop reason: SURG

## 2022-02-22 RX ORDER — SODIUM CHLORIDE, SODIUM LACTATE, POTASSIUM CHLORIDE, CALCIUM CHLORIDE 600; 310; 30; 20 MG/100ML; MG/100ML; MG/100ML; MG/100ML
100 INJECTION, SOLUTION INTRAVENOUS ONCE AS NEEDED
Status: DISCONTINUED | OUTPATIENT
Start: 2022-02-22 | End: 2022-02-22 | Stop reason: HOSPADM

## 2022-02-22 RX ORDER — ONDANSETRON 2 MG/ML
4 INJECTION INTRAMUSCULAR; INTRAVENOUS AS NEEDED
Status: DISCONTINUED | OUTPATIENT
Start: 2022-02-22 | End: 2022-02-22 | Stop reason: HOSPADM

## 2022-02-22 RX ORDER — SODIUM CHLORIDE, SODIUM LACTATE, POTASSIUM CHLORIDE, CALCIUM CHLORIDE 600; 310; 30; 20 MG/100ML; MG/100ML; MG/100ML; MG/100ML
125 INJECTION, SOLUTION INTRAVENOUS ONCE
Status: DISCONTINUED | OUTPATIENT
Start: 2022-02-22 | End: 2022-02-22 | Stop reason: HOSPADM

## 2022-02-22 RX ORDER — IPRATROPIUM BROMIDE AND ALBUTEROL SULFATE 2.5; .5 MG/3ML; MG/3ML
3 SOLUTION RESPIRATORY (INHALATION) ONCE AS NEEDED
Status: DISCONTINUED | OUTPATIENT
Start: 2022-02-22 | End: 2022-02-22 | Stop reason: HOSPADM

## 2022-02-22 RX ORDER — KETOROLAC TROMETHAMINE 30 MG/ML
30 INJECTION, SOLUTION INTRAMUSCULAR; INTRAVENOUS EVERY 6 HOURS PRN
Status: DISCONTINUED | OUTPATIENT
Start: 2022-02-22 | End: 2022-02-22 | Stop reason: HOSPADM

## 2022-02-22 RX ORDER — SODIUM CHLORIDE, SODIUM LACTATE, POTASSIUM CHLORIDE, CALCIUM CHLORIDE 600; 310; 30; 20 MG/100ML; MG/100ML; MG/100ML; MG/100ML
125 INJECTION, SOLUTION INTRAVENOUS ONCE
Status: COMPLETED | OUTPATIENT
Start: 2022-02-22 | End: 2022-02-22

## 2022-02-22 RX ADMIN — SODIUM CHLORIDE, POTASSIUM CHLORIDE, SODIUM LACTATE AND CALCIUM CHLORIDE: 600; 310; 30; 20 INJECTION, SOLUTION INTRAVENOUS at 11:25

## 2022-02-22 RX ADMIN — PROPOFOL 60 MG: 10 INJECTION, EMULSION INTRAVENOUS at 11:25

## 2022-02-22 RX ADMIN — LIDOCAINE HYDROCHLORIDE 60 MG: 20 INJECTION, SOLUTION INFILTRATION; PERINEURAL at 11:25

## 2022-02-22 RX ADMIN — PROPOFOL 200 MCG/KG/MIN: 10 INJECTION, EMULSION INTRAVENOUS at 11:25

## 2022-02-22 NOTE — ANESTHESIA POSTPROCEDURE EVALUATION
Patient: Sea Andrade    Procedure Summary     Date: 02/22/22 Room / Location: [facility] OR [address] [place] [address] COR OR    Anesthesia Start: 1123 Anesthesia Stop: 1205    Procedures:       ESOPHAGOGASTRODUODENOSCOPY WITH BIOPSY (N/A Esophagus)      COLONOSCOPY FOR SCREENING (N/A ) Diagnosis:       Abnormal CT scan, gastrointestinal tract      Encounter for colorectal cancer screening      (Abnormal CT scan, gastrointestinal tract [R93.3])      (Encounter for colorectal cancer screening [Z12.11, Z12.12])    Surgeons: Kaylee Parker MD Provider: Giancarlo Ramsay MD    Anesthesia Type: general ASA Status: 2          Anesthesia Type: general    Vitals  Vitals Value Taken Time   /94 02/22/22 1216   Temp 97.1 °F (36.2 °C) 02/22/22 1206   Pulse 76 02/22/22 1216   Resp 18 02/22/22 1216   SpO2 96 % 02/22/22 1216           Post Anesthesia Care and Evaluation    Patient location during evaluation: bedside  Patient participation: complete - patient participated  Level of consciousness: awake and alert  Pain score: 1  Pain management: adequate  Airway patency: patent  Anesthetic complications: No anesthetic complications  PONV Status: none  Cardiovascular status: acceptable  Respiratory status: acceptable  Hydration status: acceptable

## 2022-02-22 NOTE — ANESTHESIA PREPROCEDURE EVALUATION
Anesthesia Evaluation     no history of anesthetic complications:  NPO Solid Status: > 8 hours  NPO Liquid Status: > 8 hours           Airway   Mallampati: II  TM distance: >3 FB  Neck ROM: full  No difficulty expected  Dental          Pulmonary - normal exam   Cardiovascular - normal exam    (+) hypertension,       Neuro/Psych  GI/Hepatic/Renal/Endo      Musculoskeletal     Abdominal  - normal exam   Substance History      OB/GYN          Other                      Anesthesia Plan    ASA 2     general     intravenous induction     Anesthetic plan, all risks, benefits, and alternatives have been provided, discussed and informed consent has been obtained with: patient.        CODE STATUS:

## 2022-02-24 LAB
LAB AP CASE REPORT: NORMAL
PATH REPORT.FINAL DX SPEC: NORMAL

## 2022-07-07 ENCOUNTER — HOSPITAL ENCOUNTER (EMERGENCY)
Facility: HOSPITAL | Age: 57
Discharge: HOME | End: 2022-07-07
Attending: STUDENT IN AN ORGANIZED HEALTH CARE EDUCATION/TRAINING PROGRAM | Admitting: STUDENT IN AN ORGANIZED HEALTH CARE EDUCATION/TRAINING PROGRAM

## 2022-07-07 ENCOUNTER — APPOINTMENT (OUTPATIENT)
Dept: GENERAL RADIOLOGY | Facility: HOSPITAL | Age: 57
End: 2022-07-07

## 2022-07-07 VITALS
HEIGHT: 66 IN | TEMPERATURE: 97.8 F | SYSTOLIC BLOOD PRESSURE: 138 MMHG | OXYGEN SATURATION: 95 % | WEIGHT: 230 LBS | HEART RATE: 79 BPM | BODY MASS INDEX: 36.96 KG/M2 | DIASTOLIC BLOOD PRESSURE: 91 MMHG | RESPIRATION RATE: 18 BRPM

## 2022-07-07 DIAGNOSIS — S61.309A AVULSION OF FINGERNAIL, INITIAL ENCOUNTER: Primary | ICD-10-CM

## 2022-07-07 PROCEDURE — 73140 X-RAY EXAM OF FINGER(S): CPT | Performed by: RADIOLOGY

## 2022-07-07 PROCEDURE — 73140 X-RAY EXAM OF FINGER(S): CPT

## 2022-07-07 PROCEDURE — 25010000002 TETANUS-DIPHTH-ACELL PERTUSSIS 5-2.5-18.5 LF-MCG/0.5 SUSPENSION PREFILLED SYRINGE: Performed by: PHYSICIAN ASSISTANT

## 2022-07-07 PROCEDURE — 99283 EMERGENCY DEPT VISIT LOW MDM: CPT

## 2022-07-07 PROCEDURE — 90471 IMMUNIZATION ADMIN: CPT | Performed by: PHYSICIAN ASSISTANT

## 2022-07-07 PROCEDURE — 90715 TDAP VACCINE 7 YRS/> IM: CPT | Performed by: PHYSICIAN ASSISTANT

## 2022-07-07 RX ORDER — LIDOCAINE HYDROCHLORIDE 10 MG/ML
10 INJECTION, SOLUTION EPIDURAL; INFILTRATION; INTRACAUDAL; PERINEURAL ONCE
Status: COMPLETED | OUTPATIENT
Start: 2022-07-07 | End: 2022-07-07

## 2022-07-07 RX ORDER — CEPHALEXIN 500 MG/1
500 CAPSULE ORAL 4 TIMES DAILY
Qty: 40 CAPSULE | Refills: 0 | Status: SHIPPED | OUTPATIENT
Start: 2022-07-07 | End: 2022-07-17

## 2022-07-07 RX ORDER — HYDROCODONE BITARTRATE AND ACETAMINOPHEN 7.5; 325 MG/1; MG/1
1 TABLET ORAL ONCE
Status: DISCONTINUED | OUTPATIENT
Start: 2022-07-07 | End: 2022-07-07 | Stop reason: HOSPADM

## 2022-07-07 RX ADMIN — TETANUS TOXOID, REDUCED DIPHTHERIA TOXOID AND ACELLULAR PERTUSSIS VACCINE, ADSORBED 0.5 ML: 5; 2.5; 8; 8; 2.5 SUSPENSION INTRAMUSCULAR at 10:06

## 2022-07-07 RX ADMIN — LIDOCAINE HYDROCHLORIDE 10 ML: 10 INJECTION, SOLUTION EPIDURAL; INFILTRATION; INTRACAUDAL; PERINEURAL at 10:07

## 2022-07-07 RX ADMIN — SILVER NITRATE APPLICATORS 1 APPLICATION: 25; 75 STICK TOPICAL at 11:59

## 2022-07-07 NOTE — ED PROVIDER NOTES
Subjective   57-year-old male presents to the ED today for a laceration to his right index finger.  This occurred at approximately 7 AM at his home.  He states he was cutting a piece of metal and used a wire brush and cut his finger.  The bleeding is currently controlled.  He is unknown on his last tetanus vaccination.  He states that he is right-handed.  He does take Plavix but is not currently on any sort of anticoagulation.  He is not a smoker.      History provided by:  Patient  Laceration  Location:  Finger  Finger laceration location:  R index finger  Quality: avulsion    Bleeding: controlled    Time since incident:  2 hours  Laceration mechanism:  Metal edge  Pain details:     Quality:  Sharp    Timing:  Constant    Progression:  Unchanged  Foreign body present:  No foreign bodies  Relieved by:  Nothing  Worsened by:  Nothing  Tetanus status:  Unknown  Associated symptoms: no numbness, no redness and no swelling        Review of Systems   Constitutional: Negative.    HENT: Negative.    Eyes: Negative.    Respiratory: Negative.    Cardiovascular: Negative.    Gastrointestinal: Negative.    Genitourinary: Negative.    Musculoskeletal: Negative.    Skin: Positive for wound.   Neurological: Negative.    Psychiatric/Behavioral: Negative.    All other systems reviewed and are negative.      Past Medical History:   Diagnosis Date   • High blood pressure    • Osteoarthritis        No Known Allergies    Past Surgical History:   Procedure Laterality Date   • CHOLECYSTECTOMY     • COLONOSCOPY N/A 2/22/2022    Procedure: COLONOSCOPY FOR SCREENING;  Surgeon: Kaylee Parker MD;  Location: [facility];  Service: Gastroenterology;  Laterality: N/A;   • ENDOSCOPY N/A 2/22/2022    Procedure: ESOPHAGOGASTRODUODENOSCOPY WITH BIOPSY;  Surgeon: Kaylee Parker MD;  Location: [facility];  Service: Gastroenterology;  Laterality: N/A;       Family History   Problem Relation Age of Onset   • Osteoarthritis Father     • Diabetes Father        Social History     Socioeconomic History   • Marital status:    Tobacco Use   • Smoking status: Never Smoker   • Smokeless tobacco: Never Used   Vaping Use   • Vaping Use: Never used   Substance and Sexual Activity   • Alcohol use: Never   • Drug use: Never   • Sexual activity: Yes     Partners: Female           Objective   Physical Exam  Vitals and nursing note reviewed.   Constitutional:       General: He is not in acute distress.     Appearance: Normal appearance.   HENT:      Head: Normocephalic and atraumatic.      Right Ear: External ear normal.      Left Ear: External ear normal.   Eyes:      Conjunctiva/sclera: Conjunctivae normal.      Pupils: Pupils are equal, round, and reactive to light.   Cardiovascular:      Rate and Rhythm: Normal rate and regular rhythm.      Pulses: Normal pulses.      Heart sounds: Normal heart sounds.   Pulmonary:      Effort: Pulmonary effort is normal.      Breath sounds: Normal breath sounds.   Abdominal:      General: Bowel sounds are normal.      Palpations: Abdomen is soft.   Musculoskeletal:         General: Normal range of motion.      Cervical back: Normal range of motion and neck supple.   Skin:     General: Skin is warm and dry.      Capillary Refill: Capillary refill takes less than 2 seconds.      Comments: Patient has avulsed the the lateral tip of his right index finger. He has avulsed his finger nail. No bone exposure. Bleeding controlled.   Neurological:      General: No focal deficit present.      Mental Status: He is alert and oriented to person, place, and time.   Psychiatric:         Mood and Affect: Mood normal.         Procedures           ED Course  ED Course as of 07/07/22 1646   Thu Jul 07, 2022   1019 XR Finger 2+ View Right  IMPRESSION:  1.  Soft tissue defect is noted involving the distal tip of the right  index finger with metallic densities present in the soft tissues.  Correlate with physical exam findings to assess  for surface debris  versus deep or soft tissue location.  2.  The bony structures appear intact without evidence of fracture. [AH]   1027 Digital block performed at this time. Will let this take effect and then will soak in hibiclens and water for about 10-15 minutes and then will perform wound care. [AH]   1115 Patient has finished soaking his hand at this time [AH]   1646 After thoroughly cleaning the wound, there was no evidence of any metal fragments in the wound.  He was instructed on proper wound care.  He will be started on antibiotics.  He is to follow-up outpatient and will return to the ED if his symptoms change or worsen. [AH]      ED Course User Index  [] Holly Malcolm PA                                           MDM  Number of Diagnoses or Management Options     Amount and/or Complexity of Data Reviewed  Tests in the radiology section of CPT®: reviewed    Patient Progress  Patient progress: stable      Final diagnoses:   Avulsion of fingernail, initial encounter       ED Disposition  ED Disposition     ED Disposition   Discharge    Condition   Stable    Comment   --             Dilip Hinton, APRN  [address]  [facility] [address]  [phone]    Schedule an appointment as soon as possible for a visit in 1 day           Medication List      New Prescriptions    cephalexin 500 MG capsule  Commonly known as: KEFLEX  Take 1 capsule by mouth 4 (Four) Times a Day for 10 days.           Where to Get Your Medications      You can get these medications from any pharmacy    Bring a paper prescription for each of these medications  · cephalexin 500 MG capsule          Holly Malcolm PA  07/07/22 7553

## 2022-07-07 NOTE — DISCHARGE INSTRUCTIONS
Clean wound twice a day with antibacterial soap and water.  Keep the wound clean and dry as much as possible.  Wear a dressing on your wound when you are working or anytime your hands may get dirty.  Follow-up with your primary care provider in the next 1 or 2 days for a wound recheck.  Return to the ED at any time if your symptoms change or worsen.

## 2022-09-09 ENCOUNTER — DOCUMENTATION (OUTPATIENT)
Dept: GASTROENTEROLOGY | Facility: CLINIC | Age: 57
End: 2022-09-09

## 2022-09-09 DIAGNOSIS — R12 HEARTBURN: Primary | ICD-10-CM

## 2022-09-09 RX ORDER — PANTOPRAZOLE SODIUM 40 MG/1
40 TABLET, DELAYED RELEASE ORAL DAILY
Qty: 90 TABLET | Refills: 3 | Status: SHIPPED | OUTPATIENT
Start: 2022-09-09

## 2022-11-23 ENCOUNTER — TRANSCRIBE ORDERS (OUTPATIENT)
Dept: ADMINISTRATIVE | Facility: HOSPITAL | Age: 57
End: 2022-11-23

## 2022-11-23 ENCOUNTER — HOSPITAL ENCOUNTER (OUTPATIENT)
Dept: GENERAL RADIOLOGY | Facility: HOSPITAL | Age: 57
Discharge: HOME | End: 2022-11-23
Admitting: INTERNAL MEDICINE

## 2022-11-23 DIAGNOSIS — M54.2 CERVICALGIA: Primary | ICD-10-CM

## 2022-11-23 DIAGNOSIS — M54.2 CERVICALGIA: ICD-10-CM

## 2022-11-23 PROCEDURE — 72050 X-RAY EXAM NECK SPINE 4/5VWS: CPT | Performed by: RADIOLOGY

## 2022-11-23 PROCEDURE — 72050 X-RAY EXAM NECK SPINE 4/5VWS: CPT

## 2025-05-05 ENCOUNTER — HOSPITAL ENCOUNTER (OUTPATIENT)
Dept: GENERAL RADIOLOGY | Facility: HOSPITAL | Age: 60
Discharge: HOME | End: 2025-05-05
Admitting: INTERNAL MEDICINE
Payer: OTHER MISCELLANEOUS

## 2025-05-05 ENCOUNTER — TRANSCRIBE ORDERS (OUTPATIENT)
Dept: ADMINISTRATIVE | Facility: HOSPITAL | Age: 60
End: 2025-05-05
Payer: COMMERCIAL

## 2025-05-05 DIAGNOSIS — M79.632 PAIN OF LEFT FOREARM: ICD-10-CM

## 2025-05-05 DIAGNOSIS — M79.632 PAIN OF LEFT FOREARM: Primary | ICD-10-CM

## 2025-05-05 PROCEDURE — 73090 X-RAY EXAM OF FOREARM: CPT

## 2025-05-06 PROCEDURE — 73090 X-RAY EXAM OF FOREARM: CPT | Performed by: RADIOLOGY

## 2025-07-28 ENCOUNTER — OFFICE VISIT (OUTPATIENT)
Dept: ORTHOPEDIC SURGERY | Facility: CLINIC | Age: 60
End: 2025-07-28
Payer: OTHER MISCELLANEOUS

## 2025-07-28 ENCOUNTER — HOSPITAL ENCOUNTER (OUTPATIENT)
Dept: GENERAL RADIOLOGY | Facility: HOSPITAL | Age: 60
Discharge: HOME | End: 2025-07-28
Admitting: PHYSICIAN ASSISTANT
Payer: OTHER MISCELLANEOUS

## 2025-07-28 VITALS
HEART RATE: 77 BPM | DIASTOLIC BLOOD PRESSURE: 88 MMHG | BODY MASS INDEX: 34.07 KG/M2 | HEIGHT: 66 IN | SYSTOLIC BLOOD PRESSURE: 134 MMHG | WEIGHT: 212 LBS

## 2025-07-28 DIAGNOSIS — M25.512 LEFT SHOULDER PAIN, UNSPECIFIED CHRONICITY: Primary | ICD-10-CM

## 2025-07-28 DIAGNOSIS — M25.512 LEFT SHOULDER PAIN, UNSPECIFIED CHRONICITY: ICD-10-CM

## 2025-07-28 PROCEDURE — 73030 X-RAY EXAM OF SHOULDER: CPT

## 2025-07-28 PROCEDURE — 73030 X-RAY EXAM OF SHOULDER: CPT | Performed by: RADIOLOGY

## 2025-07-28 RX ORDER — LIDOCAINE HYDROCHLORIDE 10 MG/ML
5 INJECTION, SOLUTION EPIDURAL; INFILTRATION; INTRACAUDAL; PERINEURAL
Status: COMPLETED | OUTPATIENT
Start: 2025-07-28 | End: 2025-07-28

## 2025-07-28 RX ORDER — METHYLPREDNISOLONE ACETATE 40 MG/ML
40 INJECTION, SUSPENSION INTRA-ARTICULAR; INTRALESIONAL; INTRAMUSCULAR; SOFT TISSUE
Status: COMPLETED | OUTPATIENT
Start: 2025-07-28 | End: 2025-07-28

## 2025-07-28 RX ADMIN — LIDOCAINE HYDROCHLORIDE 5 ML: 10 INJECTION, SOLUTION EPIDURAL; INFILTRATION; INTRACAUDAL; PERINEURAL at 09:29

## 2025-07-28 RX ADMIN — METHYLPREDNISOLONE ACETATE 40 MG: 40 INJECTION, SUSPENSION INTRA-ARTICULAR; INTRALESIONAL; INTRAMUSCULAR; SOFT TISSUE at 09:29

## 2025-07-28 NOTE — PROGRESS NOTES
[facility] Surgery New Patient Visit          Patient: Sea Andrade  YOB: 1965  Date of Encounter: 07/28/2025  PCP: Shruti Silva MD      Subjective     Chief Complaint   Patient presents with    Left Shoulder - Pain, Initial Evaluation           History of Present Illness:        The patient is a 60-year-old male who presents today for shoulder pain.    He began experiencing left shoulder pain approximately several weeks ago, following a fall from a vehicle lift at work on 04/29/2025. He was transported to [facility] where he underwent a CT scans and further evaluation. Initially, he suspected a hip fracture due to severe pain, but no fractures were detected on x-ray.  He reports no complications with the hip following this.    In mid-May 2025, he noticed a lump on the volar aspect of his left forearm, which was tender to touch. An x-ray of the lump revealed no abnormalities, and it eventually resolved. He resumed work as a  after 3 to 4 days, with some modifications to his activities. This incident is being processed as a worker's compensation claim. He has not undergone any physical therapy. During the fall, upon his fall he lost consciousness for about a minute. He also reports numbness and tingling from his elbow to his hand, with the tip of his index finger being particularly numb.  Patient reports that shoulder pain anterior lateral in nature with pain progressing down the lateral aspect of the upper arm to the elbow.    SOCIAL HISTORY  Occupations: Works as a .         Patient Active Problem List   Diagnosis    Abnormal CT scan, gastrointestinal tract    Encounter for colorectal cancer screening     Past Medical History:   Diagnosis Date    High blood pressure     Osteoarthritis      Past Surgical History:   Procedure Laterality Date    CHOLECYSTECTOMY      COLONOSCOPY N/A 2/22/2022    Procedure: COLONOSCOPY FOR SCREENING;  Surgeon: Kaylee Parker  MD SERINA;  Location: [facility] OR;  Service: Gastroenterology;  Laterality: N/A;    ENDOSCOPY N/A 2/22/2022    Procedure: ESOPHAGOGASTRODUODENOSCOPY WITH BIOPSY;  Surgeon: Kaylee Parker MD;  Location: [facility] OR;  Service: Gastroenterology;  Laterality: N/A;     Social History     Occupational History    Not on file   Tobacco Use    Smoking status: Never    Smokeless tobacco: Never   Vaping Use    Vaping status: Never Used   Substance and Sexual Activity    Alcohol use: Never    Drug use: Never    Sexual activity: Yes     Partners: Female    Sea Andrade  reports that he has never smoked. He has never used smokeless tobacco. I have educated him on the risk of diseases from using tobacco products such as cancer, COPD, and heart disease.        Social History     Social History Narrative    Not on file     Family History   Problem Relation Age of Onset    Osteoarthritis Father     Diabetes Father      Current Outpatient Medications   Medication Sig Dispense Refill    amLODIPine (NORVASC) 2.5 MG tablet Take 1 tablet by mouth Daily.      clopidogrel (PLAVIX) 75 MG tablet Take 1 tablet by mouth Daily.      losartan-hydrochlorothiazide (HYZAAR) 100-25 MG per tablet Take 1 tablet by mouth Daily.      pantoprazole (PROTONIX) 40 MG EC tablet Take 1 tablet by mouth Daily. 90 tablet 3    bisacodyl (Dulcolax) 5 MG EC tablet Take 4 tablets at 4pm the day prior to procedure with a full glass of water. 4 tablet 0     No current facility-administered medications for this visit.     No Known Allergies         Review of Systems   Constitutional: Negative.   HENT: Negative.     Eyes: Negative.    Cardiovascular: Negative.    Respiratory: Negative.     Endocrine: Negative.    Hematologic/Lymphatic: Negative.    Skin: Negative.    Musculoskeletal:         Pertinent positives listed in HPI   Gastrointestinal: Negative.    Genitourinary: Negative.    Neurological: Negative.    Psychiatric/Behavioral: Negative.    "  Allergic/Immunologic: Negative.          Objective      Vitals:    07/28/25 0842   BP: 134/88   Pulse: 77   Weight: 96.2 kg (212 lb)   Height: 167.6 cm (66\")      BMI is >= 30 and <35. (Class 1 Obesity). The following options were offered after discussion;: exercise counseling/recommendations and nutrition counseling/recommendations      Physical Exam  Vitals and nursing note reviewed.   Constitutional:       General: He is not in acute distress.     Appearance: Normal appearance. He is not ill-appearing.   HENT:      Head: Normocephalic and atraumatic.      Right Ear: External ear normal.      Left Ear: External ear normal.      Nose: Nose normal.      Mouth/Throat:      Mouth: Mucous membranes are moist.      Pharynx: Oropharynx is clear.   Eyes:      Extraocular Movements: Extraocular movements intact.      Conjunctiva/sclera: Conjunctivae normal.      Pupils: Pupils are equal, round, and reactive to light.   Cardiovascular:      Rate and Rhythm: Normal rate.      Pulses: Normal pulses.   Pulmonary:      Effort: Pulmonary effort is normal.   Abdominal:      General: There is no distension.   Musculoskeletal:      Cervical back: Normal range of motion. No rigidity.   Skin:     General: Skin is warm and dry.      Capillary Refill: Capillary refill takes less than 2 seconds.   Neurological:      General: No focal deficit present.      Mental Status: He is alert and oriented to person, place, and time.   Psychiatric:         Mood and Affect: Mood normal.         Behavior: Behavior normal.            Musculoskeletal:  Left wrist: No notable swelling ecchymosis or erythema.  Full range of motion with oppositional extension and flexion intact  Left elbow: Positive Tinel's sign  Left shoulder: Point tenderness in the subacromial bursa and along the biceps tendon in the bicipital groove, positive Speed's test, positive Katja's maneuver, positive Souza and Neer's testing, painful empty can test but strength " intact          Radiology:         Imaging   - X-ray of the left shoulder: Acromioclavicular joint narrowing, spurring along the inferior aspect of the clavicle, and downsloping acromion    XR Shoulder 2+ View Left  Result Date: 7/28/2025    No acute findings in the left shoulder.  This report was finalized on 7/28/2025 8:42 AM by Dr. Jose Abraham MD.       XR Left Forearm, 2 Views     EXAM DATE:    5/6/2025 9:06 AM     CLINICAL HISTORY:    LT FOREARM PAIN; M79.632-Pain in left forearm     TECHNIQUE:    Frontal and lateral views of the left forearm.     COMPARISON:    No relevant prior studies available.     FINDINGS:    Bones/joints:  Unremarkable as visualized.  No acute fracture.  No  dislocation.    Soft tissues:  Unremarkable as visualized.     IMPRESSION:    No acute findings in the left forearm.     This report was finalized on 5/6/2025 9:07 AM by Dr. Jose Abraham MD.        Assessment/Plan        ICD-10-CM ICD-9-CM   1. Left shoulder pain, unspecified chronicity  M25.512 719.41            1. Left shoulder pain:  The left shoulder pain is likely due to exacerbation of pre-existing degenerative changes within the shoulder, leading to traumatic onset of shoulder impingement. This could have been triggered by the fall or subsequent modification of activities involving the forearm. The possibility of a partial tear or irritation and inflammation in the supraspinatus tendon was discussed. A subacromial injection with steroid will be administered today to alleviate swelling, inflammation, pain, and symptoms. If there is no response to the injection, an MRI will be considered for further evaluation.    A subacromial injection with steroid will be administered today to help reduce swelling, inflammation, pain, and symptoms. The patient was educated on the potential benefits of the injection, including pain relief and decreased inflammation, as well as the risks, such as infection, bleeding, and potential for  temporary increase in pain. If there is no significant improvement following the injection, an MRI will be considered to evaluate for a full-thickness tear. The patient was advised to continue modifying activities to avoid exacerbating the shoulder pain and to monitor for any adverse reactions to the injection.    2. Left forearm pain:  The left forearm pain could be due to a strain in the flexor muscle along the forearm, as evidenced by intact strength. Tenderness along the medial epicondyle could be causing some of the numbness experienced. The response of the shoulder to the steroid injection will be monitored before addressing the forearm pain.    The forearm pain will be reassessed based on the response to the shoulder treatment. If the shoulder pain improves significantly with the steroid injection, further evaluation and treatment of the forearm pain will be considered. The patient was advised to continue monitoring the forearm symptoms and report any changes or worsening of pain or numbness.    Follow-up: The patient will follow up in 3 weeks.    PROCEDURE  Subacromial injection in the left shoulder was administered today.      Large Joint Arthrocentesis: L subacromial bursa  Date/Time: 7/28/2025 9:29 AM  Consent given by: patient  Site marked: site marked  Supporting Documentation  Indications: pain and diagnostic evaluation   Procedure Details  Location: shoulder - L subacromial bursa  Needle size: 25 G  Approach: lateral  Medications administered: 5 mL lidocaine PF 1% 1 %; 40 mg methylPREDNISolone acetate 40 MG/ML  Patient tolerance: patient tolerated the procedure well with no immediate complications                      This document was signed by Jun Robison PA-C July 28, 2025     CC: Shruti Silva MD        Patient or patient representative verbalized consent for the use of Ambient Listening during the visit with  LOIS Caldwell for chart documentation. 7/28/2025  09:07 EDT      Dictated  Utilizing Dragon Dictation:   Please note that portions of this note were completed with a voice recognition program.   Part of this note may be an electronic transcription/translation of spoken language to printed text using the Dragon Dictation System.

## 2025-08-18 ENCOUNTER — TELEPHONE (OUTPATIENT)
Dept: ORTHOPEDIC SURGERY | Facility: CLINIC | Age: 60
End: 2025-08-18
Payer: COMMERCIAL

## 2025-08-19 ENCOUNTER — HOSPITAL ENCOUNTER (OUTPATIENT)
Dept: GENERAL RADIOLOGY | Facility: HOSPITAL | Age: 60
Discharge: HOME | End: 2025-08-19
Admitting: PHYSICIAN ASSISTANT
Payer: COMMERCIAL

## 2025-08-19 ENCOUNTER — OFFICE VISIT (OUTPATIENT)
Dept: ORTHOPEDIC SURGERY | Facility: CLINIC | Age: 60
End: 2025-08-19
Payer: OTHER MISCELLANEOUS

## 2025-08-19 VITALS — HEIGHT: 66 IN | WEIGHT: 212 LBS | BODY MASS INDEX: 34.07 KG/M2

## 2025-08-19 DIAGNOSIS — M54.2 CERVICALGIA: ICD-10-CM

## 2025-08-19 DIAGNOSIS — M25.512 LEFT SHOULDER PAIN, UNSPECIFIED CHRONICITY: Primary | ICD-10-CM

## 2025-08-19 PROCEDURE — 72040 X-RAY EXAM NECK SPINE 2-3 VW: CPT

## 2025-08-19 PROCEDURE — 99213 OFFICE O/P EST LOW 20 MIN: CPT | Performed by: PHYSICIAN ASSISTANT

## 2025-08-19 RX ORDER — METHYLPREDNISOLONE 4 MG/1
TABLET ORAL
Qty: 21 TABLET | Refills: 0 | Status: SHIPPED | OUTPATIENT
Start: 2025-08-19

## (undated) DEVICE — SUCTION CANISTER, 1500CC, RIGID: Brand: DEROYAL

## (undated) DEVICE — Device

## (undated) DEVICE — SYR LUERLOK 30CC

## (undated) DEVICE — CONN Y IRR DISP 1P/U

## (undated) DEVICE — SINGLE PORT MANIFOLD: Brand: NEPTUNE 2

## (undated) DEVICE — ENDOGATOR AUXILIARY WATER JET CONNECTOR: Brand: ENDOGATOR

## (undated) DEVICE — GOWN,REINF,POLY,ECL,PP SLV,XL: Brand: MEDLINE

## (undated) DEVICE — TUBING, SUCTION, 1/4" X 20', STRAIGHT: Brand: MEDLINE INDUSTRIES, INC.

## (undated) DEVICE — THE BITE BLOCK MAXI, LATEX FREE STRAP IS USED TO PROTECT THE ENDOSCOPE INSERTION TUBE FROM BEING BITTEN BY THE PATIENT.

## (undated) DEVICE — FRCP BX RADJAW4 NDL 2.8 240CM LG OG BX40

## (undated) DEVICE — Device: Brand: DEFENDO AIR/WATER/SUCTION AND BIOPSY VALVE